# Patient Record
Sex: FEMALE | Race: WHITE | NOT HISPANIC OR LATINO | Employment: FULL TIME | URBAN - METROPOLITAN AREA
[De-identification: names, ages, dates, MRNs, and addresses within clinical notes are randomized per-mention and may not be internally consistent; named-entity substitution may affect disease eponyms.]

---

## 2017-04-20 ENCOUNTER — CONVERSION ENCOUNTER (OUTPATIENT)
Dept: GENERAL RADIOLOGY | Facility: HOSPITAL | Age: 57
End: 2017-04-20

## 2018-05-24 ENCOUNTER — CONVERSION ENCOUNTER (OUTPATIENT)
Dept: GENERAL RADIOLOGY | Facility: HOSPITAL | Age: 58
End: 2018-05-24

## 2019-03-16 ENCOUNTER — HOSPITAL ENCOUNTER (OUTPATIENT)
Dept: URGENT CARE | Facility: CLINIC | Age: 59
Discharge: HOME | End: 2019-03-16
Attending: EMERGENCY MEDICINE

## 2019-08-02 ENCOUNTER — HOSPITAL ENCOUNTER (OUTPATIENT)
Dept: GENERAL RADIOLOGY | Facility: HOSPITAL | Age: 59
Discharge: HOME | End: 2019-08-02

## 2020-01-31 ENCOUNTER — HOSPITAL ENCOUNTER (OUTPATIENT)
Dept: URGENT CARE | Facility: CLINIC | Age: 60
Discharge: HOME | End: 2020-01-31
Attending: EMERGENCY MEDICINE

## 2020-02-03 ENCOUNTER — HOSPITAL ENCOUNTER (OUTPATIENT)
Dept: OTHER | Facility: HOSPITAL | Age: 60
Discharge: HOME | End: 2020-02-03
Attending: NURSE PRACTITIONER

## 2020-02-03 ENCOUNTER — OFFICE VISIT CONVERTED (OUTPATIENT)
Dept: INTERNAL MEDICINE | Facility: CLINIC | Age: 60
End: 2020-02-03
Attending: NURSE PRACTITIONER

## 2020-02-03 LAB
ALBUMIN SERPL-MCNC: 4.4 G/DL (ref 3.5–5)
ALBUMIN/GLOB SERPL: 1.5 {RATIO} (ref 1.4–2.6)
ALP SERPL-CCNC: 86 U/L (ref 53–141)
ALT SERPL-CCNC: 14 U/L (ref 10–40)
ANION GAP SERPL CALC-SCNC: 18 MMOL/L (ref 8–19)
AST SERPL-CCNC: 18 U/L (ref 15–50)
BASOPHILS # BLD AUTO: 0.04 10*3/UL (ref 0–0.2)
BASOPHILS NFR BLD AUTO: 0.8 % (ref 0–3)
BILIRUB SERPL-MCNC: 0.38 MG/DL (ref 0.2–1.3)
BUN SERPL-MCNC: 10 MG/DL (ref 5–25)
BUN/CREAT SERPL: 14 {RATIO} (ref 6–20)
CALCIUM SERPL-MCNC: 9.7 MG/DL (ref 8.7–10.4)
CHLORIDE SERPL-SCNC: 103 MMOL/L (ref 99–111)
CHOLEST SERPL-MCNC: 196 MG/DL (ref 107–200)
CHOLEST/HDLC SERPL: 3.4 {RATIO} (ref 3–6)
CONV ABS IMM GRAN: 0.02 10*3/UL (ref 0–0.2)
CONV CO2: 24 MMOL/L (ref 22–32)
CONV IMMATURE GRAN: 0.4 % (ref 0–1.8)
CONV TOTAL PROTEIN: 7.4 G/DL (ref 6.3–8.2)
CREAT UR-MCNC: 0.72 MG/DL (ref 0.5–0.9)
DEPRECATED RDW RBC AUTO: 42.8 FL (ref 36.4–46.3)
EOSINOPHIL # BLD AUTO: 0.04 10*3/UL (ref 0–0.7)
EOSINOPHIL # BLD AUTO: 0.8 % (ref 0–7)
ERYTHROCYTE [DISTWIDTH] IN BLOOD BY AUTOMATED COUNT: 14 % (ref 11.7–14.4)
GFR SERPLBLD BASED ON 1.73 SQ M-ARVRAT: >60 ML/MIN/{1.73_M2}
GLOBULIN UR ELPH-MCNC: 3 G/DL (ref 2–3.5)
GLUCOSE SERPL-MCNC: 85 MG/DL (ref 65–99)
HCT VFR BLD AUTO: 40.5 % (ref 37–47)
HDLC SERPL-MCNC: 58 MG/DL (ref 40–60)
HGB BLD-MCNC: 12.3 G/DL (ref 12–16)
LDLC SERPL CALC-MCNC: 121 MG/DL (ref 70–100)
LYMPHOCYTES # BLD AUTO: 1.79 10*3/UL (ref 1–5)
LYMPHOCYTES NFR BLD AUTO: 35.1 % (ref 20–45)
MCH RBC QN AUTO: 25.4 PG (ref 27–31)
MCHC RBC AUTO-ENTMCNC: 30.4 G/DL (ref 33–37)
MCV RBC AUTO: 83.5 FL (ref 81–99)
MONOCYTES # BLD AUTO: 0.39 10*3/UL (ref 0.2–1.2)
MONOCYTES NFR BLD AUTO: 7.6 % (ref 3–10)
NEUTROPHILS # BLD AUTO: 2.82 10*3/UL (ref 2–8)
NEUTROPHILS NFR BLD AUTO: 55.3 % (ref 30–85)
NRBC CBCN: 0 % (ref 0–0.7)
OSMOLALITY SERPL CALC.SUM OF ELEC: 288 MOSM/KG (ref 273–304)
PLATELET # BLD AUTO: 247 10*3/UL (ref 130–400)
PMV BLD AUTO: 10.7 FL (ref 9.4–12.3)
POTASSIUM SERPL-SCNC: 4.5 MMOL/L (ref 3.5–5.3)
RBC # BLD AUTO: 4.85 10*6/UL (ref 4.2–5.4)
SODIUM SERPL-SCNC: 140 MMOL/L (ref 135–147)
TRIGL SERPL-MCNC: 86 MG/DL (ref 40–150)
TSH SERPL-ACNC: 3.05 M[IU]/L (ref 0.27–4.2)
VLDLC SERPL-MCNC: 17 MG/DL (ref 5–37)
WBC # BLD AUTO: 5.1 10*3/UL (ref 4.8–10.8)

## 2020-02-04 LAB — HCV AB SER DONR QL: <0.1 S/CO RATIO (ref 0–0.9)

## 2020-08-21 ENCOUNTER — HOSPITAL ENCOUNTER (OUTPATIENT)
Dept: OTHER | Facility: HOSPITAL | Age: 60
Discharge: HOME | End: 2020-08-21
Attending: PHYSICIAN ASSISTANT

## 2020-08-21 ENCOUNTER — OFFICE VISIT CONVERTED (OUTPATIENT)
Dept: INTERNAL MEDICINE | Facility: CLINIC | Age: 60
End: 2020-08-21
Attending: PHYSICIAN ASSISTANT

## 2020-08-27 LAB
CONV LAST MENSTURAL PERIOD: NORMAL
SPECIMEN SOURCE: NORMAL
SPECIMEN SOURCE: NORMAL
THIN PREP CVX: NORMAL

## 2020-09-10 ENCOUNTER — HOSPITAL ENCOUNTER (OUTPATIENT)
Dept: GENERAL RADIOLOGY | Facility: HOSPITAL | Age: 60
Discharge: HOME | End: 2020-09-10
Attending: PHYSICIAN ASSISTANT

## 2020-09-21 ENCOUNTER — HOSPITAL ENCOUNTER (OUTPATIENT)
Dept: CT IMAGING | Facility: HOSPITAL | Age: 60
Discharge: HOME | End: 2020-09-21
Attending: NURSE PRACTITIONER

## 2020-09-21 ENCOUNTER — OFFICE VISIT CONVERTED (OUTPATIENT)
Dept: INTERNAL MEDICINE | Facility: CLINIC | Age: 60
End: 2020-09-21
Attending: NURSE PRACTITIONER

## 2020-09-21 ENCOUNTER — HOSPITAL ENCOUNTER (OUTPATIENT)
Dept: OTHER | Facility: HOSPITAL | Age: 60
Discharge: HOME | End: 2020-09-21
Attending: NURSE PRACTITIONER

## 2020-09-21 LAB
ALBUMIN SERPL-MCNC: 4.6 G/DL (ref 3.5–5)
ALBUMIN/GLOB SERPL: 1.6 {RATIO} (ref 1.4–2.6)
ALP SERPL-CCNC: 87 U/L (ref 53–141)
ALT SERPL-CCNC: 12 U/L (ref 10–40)
ANION GAP SERPL CALC-SCNC: 18 MMOL/L (ref 8–19)
AST SERPL-CCNC: 15 U/L (ref 15–50)
BASOPHILS # BLD AUTO: 0.05 10*3/UL (ref 0–0.2)
BASOPHILS NFR BLD AUTO: 0.6 % (ref 0–3)
BILIRUB SERPL-MCNC: 0.16 MG/DL (ref 0.2–1.3)
BUN SERPL-MCNC: 14 MG/DL (ref 5–25)
BUN/CREAT SERPL: 17 {RATIO} (ref 6–20)
CALCIUM SERPL-MCNC: 9.7 MG/DL (ref 8.7–10.4)
CHLORIDE SERPL-SCNC: 102 MMOL/L (ref 99–111)
CONV ABS IMM GRAN: 0.04 10*3/UL (ref 0–0.2)
CONV CO2: 26 MMOL/L (ref 22–32)
CONV IMMATURE GRAN: 0.5 % (ref 0–1.8)
CONV TOTAL PROTEIN: 7.4 G/DL (ref 6.3–8.2)
CREAT UR-MCNC: 0.82 MG/DL (ref 0.5–0.9)
DEPRECATED RDW RBC AUTO: 42.2 FL (ref 36.4–46.3)
EOSINOPHIL # BLD AUTO: 0.06 10*3/UL (ref 0–0.7)
EOSINOPHIL # BLD AUTO: 0.7 % (ref 0–7)
ERYTHROCYTE [DISTWIDTH] IN BLOOD BY AUTOMATED COUNT: 13.6 % (ref 11.7–14.4)
GFR SERPLBLD BASED ON 1.73 SQ M-ARVRAT: >60 ML/MIN/{1.73_M2}
GLOBULIN UR ELPH-MCNC: 2.8 G/DL (ref 2–3.5)
GLUCOSE SERPL-MCNC: 99 MG/DL (ref 65–99)
HCT VFR BLD AUTO: 38 % (ref 37–47)
HGB BLD-MCNC: 11.4 G/DL (ref 12–16)
LYMPHOCYTES # BLD AUTO: 2.1 10*3/UL (ref 1–5)
LYMPHOCYTES NFR BLD AUTO: 25.3 % (ref 20–45)
MCH RBC QN AUTO: 25.5 PG (ref 27–31)
MCHC RBC AUTO-ENTMCNC: 30 G/DL (ref 33–37)
MCV RBC AUTO: 85 FL (ref 81–99)
MONOCYTES # BLD AUTO: 0.6 10*3/UL (ref 0.2–1.2)
MONOCYTES NFR BLD AUTO: 7.2 % (ref 3–10)
NEUTROPHILS # BLD AUTO: 5.44 10*3/UL (ref 2–8)
NEUTROPHILS NFR BLD AUTO: 65.7 % (ref 30–85)
NRBC CBCN: 0 % (ref 0–0.7)
OSMOLALITY SERPL CALC.SUM OF ELEC: 293 MOSM/KG (ref 273–304)
PLATELET # BLD AUTO: 318 10*3/UL (ref 130–400)
PMV BLD AUTO: 10.1 FL (ref 9.4–12.3)
POTASSIUM SERPL-SCNC: 4.7 MMOL/L (ref 3.5–5.3)
RBC # BLD AUTO: 4.47 10*6/UL (ref 4.2–5.4)
SODIUM SERPL-SCNC: 141 MMOL/L (ref 135–147)
WBC # BLD AUTO: 8.29 10*3/UL (ref 4.8–10.8)

## 2020-09-23 LAB — BACTERIA UR CULT: NORMAL

## 2021-01-15 ENCOUNTER — CONVERSION ENCOUNTER (OUTPATIENT)
Dept: INTERNAL MEDICINE | Facility: CLINIC | Age: 61
End: 2021-01-15

## 2021-01-15 ENCOUNTER — OFFICE VISIT CONVERTED (OUTPATIENT)
Dept: INTERNAL MEDICINE | Facility: CLINIC | Age: 61
End: 2021-01-15
Attending: INTERNAL MEDICINE

## 2021-03-16 ENCOUNTER — HOSPITAL ENCOUNTER (OUTPATIENT)
Dept: VACCINE CLINIC | Facility: HOSPITAL | Age: 61
Discharge: HOME | End: 2021-03-16
Attending: INTERNAL MEDICINE

## 2021-04-06 ENCOUNTER — HOSPITAL ENCOUNTER (OUTPATIENT)
Dept: VACCINE CLINIC | Facility: HOSPITAL | Age: 61
Discharge: HOME | End: 2021-04-06
Attending: INTERNAL MEDICINE

## 2021-05-13 NOTE — PROGRESS NOTES
"   Progress Note      Patient Name: April Lares   Patient ID: [id number]   Sex: Female   YOB: 1960    Primary Care Provider: Sujata LIN   Referring Provider: Sujata LIN    Visit Date: September 21, 2020    Provider: RILEY Patel   Location: [facility]   Location Address: [address] [place] [address]  [id number]   Location Phone: [phone]          Chief Complaint  · Acute visit      History Of Present Illness  April Lares is a 59 year old /White female who presents for evaluation and treatment of:      Acute visit    Patient reports abdominal pain since Friday that has worsened. Constant pain. Worse on the LLQ, radiates throughout the lower abdomen.      \"something going to explode\" per the patient.   Constant, sore, aching like sensation.   Standing makes the pain better  Sitting and lying down make it worse.   Denies urinary sx(dysuria, flank pain, frequency), fever, n/v, diarrhea, vaginal discharge    hx of ovarian cyst.          Past Medical History  Disease Name Date Onset Notes   Allergies --  --    Anemia --  --    Anemia, Unspecified --  --    Depression --  --    Eczema --  as a child   Vertigo --  --          Past Surgical History  Procedure Name Date Notes   Appendectomy --  --    Hysterectomy --  --    Jaw Surgery --  --          Medication List  Name Date Started Instructions   meclizine 25 mg oral tablet 07/20/2020 take 1 tablet (25 mg) by oral route 3 times per day as needed         Allergy List  Allergen Name Date Reaction Notes   Latex Exam Gloves --  --  --    propylene glycol --  --  --    Wellbutrin --  --  --        Allergies Reconciled  Family Medical History  Disease Name Relative/Age Notes   Diabetes, unspecified type Father/   Father         Social History  Finding Status Start/Stop Quantity Notes   Alcohol Never 0/0 --  drinks no   Caffeine Current every day 0/0 --  drinks regularly; coffee and " "tea; 1-2 times per day   Second hand smoke exposure Never 0/0 --  no   Tobacco Never 0/0 --  never a smoker         Review of Systems  · Constitutional  o Denies  o : fever, fatigue, weight loss, weight gain  · Cardiovascular  o Denies  o : lower extremity edema, claudication, chest pressure, palpitations  · Respiratory  o Denies  o : shortness of breath, wheezing, cough, hemoptysis, dyspnea on exertion  · Gastrointestinal  o Admits  o : abdominal pain  o Denies  o : nausea, vomiting, diarrhea, constipation      Vitals  Date Time BP Position Site L\R Cuff Size HR RR TEMP (F) WT  HT  BMI kg/m2 BSA m2 O2 Sat HC       02/03/2020 08:35 /72 Sitting    68 - R 16 98.2 142lbs 6oz 5'  2.5\" 25.63 1.69 100 %    08/21/2020 03:23 /62 Sitting    77 - R 15 97.8     100 %    09/21/2020 01:20 /68 Sitting    71 - R 18 98.2  5'  2.5\"   100 %          Physical Examination  · Constitutional  o Appearance  o : no acute distress, well-nourished  · Head and Face  o Head  o :   § Inspection  § : atraumatic, normocephalic  · Eyes  o Eyes  o : extraocular movements intact, no scleral icterus, no conjunctival injection  · Ears, Nose, Mouth and Throat  o Ears  o :   § External Ears  § : normal  § Otoscopic Examination  § : tympanic membrane appearance within normal limits bilaterally  o Nose  o :   § Intranasal Exam  § : nares patent  o Oral Cavity  o :   § Oral Mucosa  § : moist mucous membranes  o Throat  o :   § Oropharynx  § : no inflammation or lesions present, tonsils within normal limits  · Respiratory  o Respiratory Effort  o : breathing comfortably, symmetric chest rise  o Auscultation of Lungs  o : clear to asculatation bilaterally, no wheezes, rales, or rhonchii  · Cardiovascular  o Heart  o :   § Auscultation of Heart  § : regular rate and rhythm, no murmurs, rubs, or gallops  o Peripheral Vascular System  o :   § Extremities  § : no edema  · Gastrointestinal  o Abdominal Examination  o :   § Abdomen  § : bowel " sounds present, non-distended, severe tenderness to the left lower quadrant with palpation.   · Lymphatic  o Neck  o : no lymphadenopathy present  o Supraclavicular Nodes  o : no supraclavicular nodes  · Skin and Subcutaneous Tissue  o General Inspection  o : no lesions present, no areas of discoloration, skin turgor normal  · Neurologic  o Mental Status Examination  o :   § Orientation  § : grossly oriented to person, place and time  o Gait and Station  o :   § Gait Screening  § : normal gait  · Psychiatric  o General  o : normal mood and affect          Results  · In-Office Procedures  o Lab procedure  § IOP - Urinalysis without Microscopy (Clinitek) [facility] ([id number])   § Color Ur: Dark yellow   § Clarity Ur: Slightly cloudy   § Glucose Ur Ql Strip: Negative   § Bilirub Ur Ql Strip: Negative   § Ketones Ur Ql Strip: Trace   § Sp Gr Ur Qn: greater than 1.030   § Hgb Ur Ql Strip: Trace-Intact   § pH Ur-LsCnc: 5.5   § Prot Ur Ql Strip: Negative   § Urobilinogen Ur Strip-mCnc: 0.2 E.U./dL   § Nitrite Ur Ql Strip: Negative   § WBC Est Ur Ql Strip: Negative       Assessment  · Abdominal pain     789.00/R10.9  · LLQ pain     789.04/R10.32  patient set up for a CT scan this afternoon at 6:00 pm with oral contrast & IV contrast. Patient educated and giving information on how to take the oral contrast. Will follow up after CT scan report. Labs drawn in office. Educated patient that if her pain worsens prior to Ct scan to report to the ER.   · Hematuria     599.70/R31.9    Problems Reconciled  Plan  · Orders  o CBC with Auto Diff [facility] (84747) - 789.00/R10.9 - 09/21/2020  o CMP [facility] (26494) - 789.00/R10.9 - 09/21/2020  o Abdomen and Pelvis (CT) (with contrast) (61750) - 789.00/R10.9 - 09/21/2020  o Urine culture (47101, 61605) - 789.00/R10.9 - 09/21/2020  o ACO-39: Current medications updated and reviewed () - - 09/21/2020  · Medications  o Medications have been Reconciled  o Transition of Care or Provider  Policy  · Instructions  o Instructed to seek medical attention urgently for new or worsening symptoms.  o Recommended BRAT diet.  o Clear fluids and avoid dairy for the next 24 hours.  o Rest. Increase Fluids.  o Patient was educated/instructed on their diagnosis, treatment and medications prior to discharge from the clinic today.  o Call the office with any concerns or questions.  · Disposition  o Call or Return if symptoms worsen or persist.  o As Needed for Follow Up  o Please Schedule Imaging            Electronically Signed by: RILEY Patel -Author on September 21, 2020 03:29:35 PM

## 2021-05-13 NOTE — PROGRESS NOTES
Progress Note      Patient Name: April Lares   Patient ID: [id number]   Sex: Female   YOB: 1960    Primary Care Provider: Sujata LIN   Referring Provider: Sujata LIN    Visit Date: August 21, 2020    Provider: Joselin Caba PA-C   Location: [facility]   Location Address: [address] [place] [address]  [id number]   Location Phone: [phone]          Chief Complaint  · Annual Exam  · PAP exam      History Of Present Illness  Last PAP Smear: July 2019.   Date of Last Mammogram: 2019.   Date of Last Colonoscopy: 2016   No current complaints.      Partial Hysterectomy 2014.  She has bilateral ovaries.   She denies vaginal discharge, odor, lesions. Declines std testing.    She is due for mammogram next month. She does self breast exams at home.       Past Medical History  Disease Name Date Onset Notes   Allergies --  --    Anemia --  --    Anemia, Unspecified --  --    Depression --  --    Eczema --  as a child   Vertigo --  --          Past Surgical History  Procedure Name Date Notes   Appendectomy --  --    Hysterectomy --  --    Jaw Surgery --  --          Medication List  Name Date Started Instructions   meclizine 25 mg oral tablet 07/20/2020 take 1 tablet (25 mg) by oral route 3 times per day as needed         Allergy List  Allergen Name Date Reaction Notes   Latex Exam Gloves --  --  --    propylene glycol --  --  --    Wellbutrin --  --  --        Allergies Reconciled  Family Medical History  Disease Name Relative/Age Notes   Diabetes, unspecified type Father/   Father         Social History  Finding Status Start/Stop Quantity Notes   Alcohol Never 0/0 --  drinks no   Caffeine Current every day 0/0 --  drinks regularly; coffee and tea; 1-2 times per day   Second hand smoke exposure Never 0/0 --  no   Tobacco Never 0/0 --  never a smoker         Review of Systems  · Constitutional  o Denies  o : appetite change, fatigue, night sweats, weight  "gain, weight loss  · HENT  o Denies  o : hearing loss, tinnitus, vertigo, nasal discharge, nose bleeds, dental pain, mouth lesions, sore throat  · Breasts  o Denies  o : Lumps, tenderness, swelling, Nipple Discharge  · Cardiovascular  o Denies  o : chest Pain, decreased exercise tolerance, exertional dyspnea, palpitations  · Respiratory  o Denies  o : frequent cough, shortness of breath, wheezing, snoring, apneas  · Gastrointestinal  o Denies  o : abdominal pain, nausea, vomiting, dysphagia, reflux/heartburn, changes in bowel habits, constipation, diarrhea  · Genitourinary  o Denies  o : dysuria, hematuria, incontinence, nocturia, urinary frequency, urinary urgency, sexual dysfunction  · Neurologic  o Denies  o : abnormal gait, facial weakness, headache, memory problems, tingling or numbness, seizures, tremor  · Psychiatric  o Denies  o : anxiety, decreased concentration, irritability, panic attacks, sleep distrubances, sadness/tearfulness      Vitals  Date Time BP Position Site L\R Cuff Size HR RR TEMP (F) WT  HT  BMI kg/m2 BSA m2 O2 Sat        02/03/2020 08:35 /72 Sitting    68 - R 16 98.2 142lbs 6oz 5'  2.5\" 25.63 1.69 100 %    08/21/2020 03:23 /62 Sitting    77 - R 15 97.8     100 %          Physical Examination  · Constitutional  o Appearance  o : well-nourished, in no acute distress  · Neck  o Inspection/Palpation  o : normal appearance, no masses or tenderness, trachea midline  o Thyroid  o : gland size normal, nontender, no nodules or masses present on palpation  · Respiratory  o Respiratory Effort  o : breathing unlabored  o Inspection of Chest  o : normal appearance  o Auscultation of Lungs  o : normal breath sounds throughout  · Cardiovascular  o Heart  o :   § Auscultation of Heart  § : regular rate and rhythm, no murmurs, gallops or rubs  · Breasts  o Inspection of Breasts  o : breasts symmetrical, no skin changes, no deformities present, no discharge present  o Palpation of Breasts, " Axillae  o : no masses present on palpation, no breast tenderness  · Gastrointestinal  o Abdominal Examination  o : abdomen nontender to palpation, tone normal without rigidity or guarding, no masses present, normal bowel sounds  · Genitourinary  o External Genitalia  o : no inflammation, no lesions present  o Vagina  o : normal vaginal vault, no discharge present, no inflammatory lesions present, no masses present  o Bladder  o : nontender to palpation  o Cervix  o : surgically absent  o Uterus  o : surgically absent  o Adnexa  o : no tenderness or masses present on bimanual examination  o Anus  o : no inflammation or lesions present  o Perineum  o : perineum within normal limits  · Lymphatic  o Neck  o : no lymphadenopathy present  o Axilla  o : no lymphadenopathy present  o Groin  o : no lymphadenopathy present  · Neurologic  o Mental Status Examination  o :   § Orientation  § : grossly oriented to person, place and time  o Gait and Station  o : normal gait, able to stand without difficulty  · Psychiatric  o Judgement and Insight  o : judgment and insight intact  o Mood and Affect  o : mood normal, affect appropriate              Assessment  · Screening for colon cancer     V76.51/Z12.11  Pt UTD on colonoscopy  · Routine gynecological examination     V72.31/Z01.419  · Pap smear, as part of routine gynecological examination     V76.2/Z01.419  · Screening Mammogram     V76.10/Z12.39      Plan  · Orders  o Pap smear (69821) - V76.2/Z01.419 - 08/21/2020  o Screening Mammogram 2D Bilateral (61941, ) - V76.10/Z12.39 - 08/21/2020   ARELI  · Medications  o Medications have been Reconciled  o Transition of Care or Provider Policy  · Instructions  o **Pap Test/Liquid Based:   o Source:  o ********  o **Perform Reflex Human Papilloma Virus (HPV) High Risk on this Pap (If atypical squamous cells of the undetermined signifigcance (ASCUS)/Atypical Glandular Cells of undetermined significance (AGCUS): Low Grade Squamous  Intraepitheal lesion (LGSIL): **  o No  o ********  o Medicare:  o **Is this an annual PAP:  o Yes  o Counseled on monthly breast self exams.   o Counseled on STD prevention.  o Counseled on diet and exercise.   o Counseled on weight-bearing exercise.  o Recommended Calcium with Vitamin D twice daily.  · Disposition  o Call or Return if symptoms worsen or persist.  o Follow up in 6 months            Electronically Signed by: Joselin Caba PA-C -Author on August 22, 2020 08:30:36 AM

## 2021-05-14 VITALS
SYSTOLIC BLOOD PRESSURE: 100 MMHG | TEMPERATURE: 97.6 F | HEIGHT: 62 IN | DIASTOLIC BLOOD PRESSURE: 50 MMHG | OXYGEN SATURATION: 100 % | HEART RATE: 80 BPM

## 2021-05-14 VITALS
OXYGEN SATURATION: 100 % | RESPIRATION RATE: 18 BRPM | TEMPERATURE: 98.2 F | HEIGHT: 62 IN | SYSTOLIC BLOOD PRESSURE: 106 MMHG | HEART RATE: 71 BPM | DIASTOLIC BLOOD PRESSURE: 68 MMHG

## 2021-05-14 VITALS
RESPIRATION RATE: 15 BRPM | SYSTOLIC BLOOD PRESSURE: 104 MMHG | HEART RATE: 77 BPM | DIASTOLIC BLOOD PRESSURE: 62 MMHG | OXYGEN SATURATION: 100 % | TEMPERATURE: 97.8 F

## 2021-05-14 NOTE — PROGRESS NOTES
Progress Note      Patient Name: April Lares   Patient ID: [id number]   Sex: Female   YOB: 1960    Primary Care Provider: Sujata LIN   Referring Provider: Sujata LIN    Visit Date: January 15, 2021    Provider: Thao Adams MD   Location: [facility]   Location Address: [address] [place] [address]  [id number]   Location Phone: [phone]          Chief Complaint  · possible hemorrhoids      History Of Present Illness  April Lares is a 60 year old /White female who presents for evaluation and treatment of:      possible hemorrhoids- started in december, cleared up a little however now it has come back.    worse with having a bowel movement, no hard stools, no straining     no itching, just painful, having blood with bowel movements    has been using prep H cream and hem suppository               Past Medical History  Disease Name Date Onset Notes   Allergies --  --    Anemia --  --    Anemia, Unspecified --  --    Depression --  --    Eczema --  as a child   Vertigo --  --          Past Surgical History  Procedure Name Date Notes   Appendectomy --  --    Hysterectomy --  --    Jaw Surgery --  --          Medication List  Name Date Started Instructions   meclizine 25 mg oral tablet 07/20/2020 take 1 tablet (25 mg) by oral route 3 times per day as needed   Proctofoam 1 % topical foam 01/22/2021 apply by external route 2 times a day as needed   Rectiv 0.4 % (w/w) rectal ointment 01/20/2021 apply by rectal route 2 times a day as needed         Allergy List  Allergen Name Date Reaction Notes   Latex Exam Gloves --  --  --    propylene glycol --  --  --    Wellbutrin --  --  --        Allergies Reconciled  Family Medical History  Disease Name Relative/Age Notes   Diabetes, unspecified type Father/   Father         Social History  Finding Status Start/Stop Quantity Notes   Alcohol Never 0/0 --  drinks no   Caffeine Current every  "day 0/0 --  drinks regularly; coffee and tea; 1-2 times per day   Second hand smoke exposure Never 0/0 --  no   Tobacco Never 0/0 --  never a smoker         Review of Systems  · Constitutional  o Denies  o : fever, fatigue, weight loss, weight gain  · Cardiovascular  o Denies  o : lower extremity edema, claudication, chest pressure, palpitations  · Respiratory  o Denies  o : shortness of breath, wheezing, cough, hemoptysis, dyspnea on exertion  · Gastrointestinal  o Denies  o : nausea, vomiting, diarrhea, constipation, abdominal pain      Vitals  Date Time BP Position Site L\R Cuff Size HR RR TEMP (F) WT  HT  BMI kg/m2 BSA m2 O2 Sat FR L/min FiO2 HC       08/21/2020 03:23 /62 Sitting    77 - R 15 97.8     100 %      09/21/2020 01:20 /68 Sitting    71 - R 18 98.2  5'  2.5\"   100 %  21%    01/15/2021 04:06 /50 Sitting    80 - R  97.6  5'  2.5\"   100 %            Physical Examination  · Constitutional  o Appearance  o : no acute distress, well-nourished  · Head and Face  o Head  o :   § Inspection  § : atraumatic, normocephalic  · Eyes  o Eyes  o : extraocular movements intact, no scleral icterus, no conjunctival injection  · Ears, Nose, Mouth and Throat  o Ears  o :   § External Ears  § : normal  o Nose  o :   § Intranasal Exam  § : nares patent  o Oral Cavity  o :   § Oral Mucosa  § : moist mucous membranes  · Respiratory  o Respiratory Effort  o : breathing comfortably, symmetric chest rise  o Auscultation of Lungs  o : clear to asculatation bilaterally, no wheezes, rales, or rhonchii  · Cardiovascular  o Heart  o :   § Auscultation of Heart  § : regular rate and rhythm, no murmurs, rubs, or gallops  o Peripheral Vascular System  o :   § Extremities  § : no edema  · Neurologic  o Mental Status Examination  o :   § Orientation  § : grossly oriented to person, place and time  o Gait and Station  o :   § Gait Screening  § : normal gait  · Psychiatric  o General  o : normal mood and " affect              Assessment  · Hemorrhoids     455.6/K64.9  will give trial of topical vasoactive medication  can continue topical steroid       Plan  · Orders  o ACO-39: Current medications updated and reviewed (1159F, ) - - 01/15/2021  · Medications  o Rectiv 0.4 % (w/w) rectal ointment   SIG: apply by rectal route 2 times a day as needed   DISP: (1) Tube with 0 refills  Prescribed on 01/15/2021     o Medications have been Reconciled  o Transition of Care or Provider Policy  · Instructions  o Patient was educated/instructed on their diagnosis, treatment and medications prior to discharge from the clinic today.  o Patient instructed to seek medical attention urgently for new or worsening symptoms.  o Call the office with any concerns or questions.            Electronically Signed by: Thao Adams MD -Author on February 10, 2021 03:17:31 PM

## 2021-05-15 VITALS
WEIGHT: 142.37 LBS | SYSTOLIC BLOOD PRESSURE: 130 MMHG | HEIGHT: 62 IN | TEMPERATURE: 98.2 F | HEART RATE: 68 BPM | OXYGEN SATURATION: 100 % | BODY MASS INDEX: 26.2 KG/M2 | DIASTOLIC BLOOD PRESSURE: 72 MMHG | RESPIRATION RATE: 16 BRPM

## 2021-09-09 ENCOUNTER — TRANSCRIBE ORDERS (OUTPATIENT)
Dept: ADMINISTRATIVE | Facility: HOSPITAL | Age: 61
End: 2021-09-09

## 2021-09-09 DIAGNOSIS — Z12.31 VISIT FOR SCREENING MAMMOGRAM: Primary | ICD-10-CM

## 2021-11-30 ENCOUNTER — HOSPITAL ENCOUNTER (OUTPATIENT)
Dept: MAMMOGRAPHY | Facility: HOSPITAL | Age: 61
Discharge: HOME | End: 2021-11-30
Admitting: NURSE PRACTITIONER

## 2021-11-30 DIAGNOSIS — Z12.31 VISIT FOR SCREENING MAMMOGRAM: ICD-10-CM

## 2021-11-30 PROCEDURE — 77063 BREAST TOMOSYNTHESIS BI: CPT

## 2021-11-30 PROCEDURE — 77067 SCR MAMMO BI INCL CAD: CPT

## 2022-08-01 ENCOUNTER — APPOINTMENT (OUTPATIENT)
Dept: GENERAL RADIOLOGY | Facility: HOSPITAL | Age: 62
End: 2022-08-01

## 2022-08-01 ENCOUNTER — HOSPITAL ENCOUNTER (EMERGENCY)
Facility: HOSPITAL | Age: 62
Discharge: HOME | End: 2022-08-01
Attending: EMERGENCY MEDICINE | Admitting: EMERGENCY MEDICINE

## 2022-08-01 VITALS
OXYGEN SATURATION: 100 % | BODY MASS INDEX: 26.04 KG/M2 | SYSTOLIC BLOOD PRESSURE: 110 MMHG | HEIGHT: 62 IN | RESPIRATION RATE: 13 BRPM | TEMPERATURE: 98.4 F | DIASTOLIC BLOOD PRESSURE: 64 MMHG | HEART RATE: 71 BPM

## 2022-08-01 DIAGNOSIS — M54.9 BACK PAIN WITH SCIATICA: Primary | ICD-10-CM

## 2022-08-01 DIAGNOSIS — M54.30 BACK PAIN WITH SCIATICA: Primary | ICD-10-CM

## 2022-08-01 PROCEDURE — 72100 X-RAY EXAM L-S SPINE 2/3 VWS: CPT

## 2022-08-01 PROCEDURE — 99282 EMERGENCY DEPT VISIT SF MDM: CPT

## 2022-08-01 PROCEDURE — 25010000002 ORPHENADRINE CITRATE PER 60 MG: Performed by: NURSE PRACTITIONER

## 2022-08-01 PROCEDURE — 25010000002 KETOROLAC TROMETHAMINE PER 15 MG: Performed by: NURSE PRACTITIONER

## 2022-08-01 PROCEDURE — 96372 THER/PROPH/DIAG INJ SC/IM: CPT

## 2022-08-01 RX ORDER — METHOCARBAMOL 750 MG/1
750 TABLET, FILM COATED ORAL 3 TIMES DAILY PRN
Qty: 10 TABLET | Refills: 0 | Status: SHIPPED | OUTPATIENT
Start: 2022-08-01 | End: 2022-11-16

## 2022-08-01 RX ORDER — KETOROLAC TROMETHAMINE 30 MG/ML
30 INJECTION, SOLUTION INTRAMUSCULAR; INTRAVENOUS ONCE
Status: COMPLETED | OUTPATIENT
Start: 2022-08-01 | End: 2022-08-01

## 2022-08-01 RX ORDER — MELOXICAM 7.5 MG/1
7.5 TABLET ORAL DAILY
Qty: 10 TABLET | Refills: 0 | Status: SHIPPED | OUTPATIENT
Start: 2022-08-01 | End: 2022-11-16

## 2022-08-01 RX ORDER — ORPHENADRINE CITRATE 30 MG/ML
60 INJECTION INTRAMUSCULAR; INTRAVENOUS ONCE
Status: COMPLETED | OUTPATIENT
Start: 2022-08-01 | End: 2022-08-01

## 2022-08-01 RX ADMIN — ORPHENADRINE CITRATE 60 MG: 30 INJECTION INTRAMUSCULAR; INTRAVENOUS at 10:40

## 2022-08-01 RX ADMIN — KETOROLAC TROMETHAMINE 30 MG: 30 INJECTION, SOLUTION INTRAMUSCULAR; INTRAVENOUS at 10:40

## 2022-08-01 NOTE — DISCHARGE INSTRUCTIONS
Please follow-up with your primary care provider, if you not have a primary care provider please utilize patient connection above to establish care  Return to the ED for new or worsening symptoms  Follow up with Specialist if indicated above-call for an appointment  Do not take additional NSAIDs such as Advil, Motrin, ibuprofen, Aleve while taking prescribed Mobic

## 2022-08-01 NOTE — ED PROVIDER NOTES
Subjective    Chief Complaint   Patient presents with   • Back Pain     Sujata Haywood APRN  No LMP recorded. Patient has had a hysterectomy.  Allergies   Allergen Reactions   • Bupropion Hives   • Latex Hives   • Propylene Glycol Hives       Patient is a 61-year-old female presents emergency department complaint of left low back pain rating down her left leg.  Patient reports this is been an ongoing problem for her for several months, but the pain became worse recently.  She feels that her sciatica is inflamed.  Denies any new numbness, tingling, weakness, no urinary retention, bowel or bladder incontinence, no IVDA.            Review of Systems   Constitutional: Negative for chills and fever.   Musculoskeletal: Positive for back pain. Negative for neck pain.   Neurological: Negative for weakness and numbness.       Past Medical History:   Diagnosis Date   • Anemia    • Diverticulosis        Allergies   Allergen Reactions   • Bupropion Hives   • Latex Hives   • Propylene Glycol Hives       Past Surgical History:   Procedure Laterality Date   • APPENDECTOMY     • HYSTERECTOMY     • MANDIBLE SURGERY         History reviewed. No pertinent family history.    Social History     Socioeconomic History   • Marital status: Single   Tobacco Use   • Smoking status: Never Smoker   • Smokeless tobacco: Never Used   Vaping Use   • Vaping Use: Never used   Substance and Sexual Activity   • Alcohol use: Not Currently   • Drug use: Never           Objective   Physical Exam  Vitals and nursing note reviewed.   Constitutional:       General: She is not in acute distress.     Appearance: Normal appearance. She is not ill-appearing, toxic-appearing or diaphoretic.   Cardiovascular:      Rate and Rhythm: Normal rate and regular rhythm.      Heart sounds: Normal heart sounds.   Pulmonary:      Effort: Pulmonary effort is normal.      Breath sounds: Normal breath sounds.   Musculoskeletal:        Back:       Comments: No vertebral point  "tenderness noted to the C-spine T-spine or L-spine.  No palpable step-offs.  No soft tissue tenderness is noted.  No skin abnormalities are appreciated.  No saddle anesthesia   Skin:     General: Skin is warm and dry.      Capillary Refill: Capillary refill takes less than 2 seconds.   Neurological:      Mental Status: She is alert and oriented to person, place, and time.         Procedures           ED Course           /64 (BP Location: Left arm, Patient Position: Sitting)   Pulse 71   Temp 98.4 °F (36.9 °C) (Oral)   Resp 13   Ht 157.5 cm (62\")   SpO2 100%   BMI 26.04 kg/m²   Labs Reviewed - No data to display  Medications   ketorolac (TORADOL) injection 30 mg (30 mg Intramuscular Given 8/1/22 1040)   orphenadrine (NORFLEX) injection 60 mg (60 mg Intramuscular Given 8/1/22 1040)     XR Spine Lumbar 2 or 3 View    Result Date: 8/1/2022    1. No acute osseous abnormality or significant degenerative disc disease of the lumbar spine.  2. Mild endplate osteophyte formation of the upper lumbar levels.      PADDY LEI MD       Electronically Signed and Approved By: PADDY LEI MD on 8/01/2022 at 11:09                                             [facility]  Labs and imaging ordered and reviewed above.   --Differentials: Sciatica, cauda equina, lumbar strain    --Patient was brought back to the emergency department room for evaluation and placed on appropriate monitoring.  Patient resting comfortably.  X-ray imaging reviewed as above.  No acute findings.  Will treat with anti-inflammatory and muscle relaxer.  Patient has point with PCP tomorrow.  This document is intended for medical expert use only. Reading of this document by patients and/or patient's family without participating medical staff guidance may result in misinterpretation and unintended morbidity.  Any interpretation of such data is the responsibility of the patient and/or family member responsible for the patient in concert with their primary " or specialist providers, not to be left for sources of online searches such as FundedByMe, RMI or similar queries. Relying on these approaches to knowledge may result in misinterpretation, misguided goals of care and even death should patients or family members try recommendations outside of the realm of professional medical care in a supervised inpatient environment.Appropriate PPE was worn during the duration of the care for this patient while in the emergency department per [facility] Policy                                        Final diagnoses:   Back pain with sciatica       ED Disposition  ED Disposition     ED Disposition   Discharge    Condition   Stable    Comment   --             Sujata Haywood, APRGEORGINA  [address] [address] [phone]      as scheduled tomorrow    PATIENT CONNECTION - [place] [id number]  [phone]  Schedule an appointment as soon as possible for a visit            Medication List      New Prescriptions    meloxicam 7.5 MG tablet  Commonly known as: MOBIC  Take 1 tablet by mouth Daily.     methocarbamol 750 MG tablet  Commonly known as: ROBAXIN  Take 1 tablet by mouth 3 (Three) Times a Day As Needed for Muscle Spasms.        Stop    cyclobenzaprine 10 MG tablet  Commonly known as: FLEXERIL     diclofenac 50 MG EC tablet  Commonly known as: VOLTAREN           Where to Get Your Medications      These medications were sent to [facility], [place] - [address] AT [facility] [address] - [phone]  - [phone]   [address] [facility] [address]    Phone: [phone]   · meloxicam 7.5 MG tablet  · methocarbamol 750 MG tablet          Paris Selby, APRN  08/01/22 1249

## 2022-11-01 ENCOUNTER — TRANSCRIBE ORDERS (OUTPATIENT)
Dept: ADMINISTRATIVE | Facility: HOSPITAL | Age: 62
End: 2022-11-01

## 2022-11-01 DIAGNOSIS — Z12.31 BREAST CANCER SCREENING BY MAMMOGRAM: Primary | ICD-10-CM

## 2022-11-16 ENCOUNTER — OFFICE VISIT (OUTPATIENT)
Dept: INTERNAL MEDICINE | Facility: CLINIC | Age: 62
End: 2022-11-16

## 2022-11-16 VITALS
HEART RATE: 71 BPM | DIASTOLIC BLOOD PRESSURE: 62 MMHG | TEMPERATURE: 96.9 F | SYSTOLIC BLOOD PRESSURE: 114 MMHG | OXYGEN SATURATION: 99 %

## 2022-11-16 DIAGNOSIS — K59.00 CONSTIPATION, UNSPECIFIED CONSTIPATION TYPE: ICD-10-CM

## 2022-11-16 DIAGNOSIS — M54.42 CHRONIC LEFT-SIDED LOW BACK PAIN WITH LEFT-SIDED SCIATICA: Primary | ICD-10-CM

## 2022-11-16 DIAGNOSIS — Z23 NEED FOR INFLUENZA VACCINATION: ICD-10-CM

## 2022-11-16 DIAGNOSIS — G89.29 CHRONIC LEFT-SIDED LOW BACK PAIN WITH LEFT-SIDED SCIATICA: Primary | ICD-10-CM

## 2022-11-16 PROCEDURE — 90471 IMMUNIZATION ADMIN: CPT | Performed by: PHYSICIAN ASSISTANT

## 2022-11-16 PROCEDURE — 90686 IIV4 VACC NO PRSV 0.5 ML IM: CPT | Performed by: PHYSICIAN ASSISTANT

## 2022-11-16 PROCEDURE — 99214 OFFICE O/P EST MOD 30 MIN: CPT | Performed by: PHYSICIAN ASSISTANT

## 2022-11-16 NOTE — ASSESSMENT & PLAN NOTE
Discussed low back pain with patient. Patient advised to take medications as directed, can start daily NSAID for inflammation. Take with food to prevent GI upset. Will get MRI due to sx and abnormal xray. Use heat and ice for symptomatic relief. Patient was advised to rest initially then slowly increase activity level as tolerated. Monitor changes in symptoms such as worsening numbness, tingling, or weakness in the legs, changes in bowel or bladder habits, or worsening pain. Proper ergonomics discussed. Will refer to PT or neurosurgery based on imaging results. Patient understands and agrees with plan.

## 2022-11-16 NOTE — ASSESSMENT & PLAN NOTE
Discussed constipation. Increase water intake and fiber in diet (fresh fruits and vegetables). Will try stool cleanout, paper with instructions given today. Will use 1 dose an hour x 8 hours max, if no result in 1 day then repeat the next day. Continue dosing until patient is having clear, liquid stools. Then start daily miralax to help prevent constipation. Discussed need to rtc if no improvement with conservative treatment, if symptoms worsen, or if patient had blood in stool. Parent understands and agrees

## 2022-11-16 NOTE — PROGRESS NOTES
Chief Complaint  Leg Pain (Left pain- 9 months) and Back Pain (Lower back- ongoing chronic)    Subjective          April Lares presents to [facility]  History of Present Illness  Pt here for eval of LBP and left leg pain x 9 months   Sx come and goes  Back pain is more constant, chronic pain  Aug 2022 she states sx flared up after bending incorrectly. She was seen in the ER at that time and had XR. States she was told she had arthritis.   Pain worse if sitting, laying.  Pain improved with standing  Pain moves from lower back down into left leg  She has numbness/tingling in L leg   Denies incontinence, saddle anesthesia   Denies known trauma to back  She has tried nsaids, muscle relaxers without improvement.     Constipation: has to use suppository to have bm every few days  Otherwise she does not have bm on her own   Admits to not drinking water.           Past Medical History:   Diagnosis Date   • Anemia    • Diverticulosis         Past Surgical History:   Procedure Laterality Date   • APPENDECTOMY     • HYSTERECTOMY     • MANDIBLE SURGERY          Current Outpatient Medications on File Prior to Visit   Medication Sig Dispense Refill   • [DISCONTINUED] meloxicam (MOBIC) 7.5 MG tablet Take 1 tablet by mouth Daily. 10 tablet 0   • [DISCONTINUED] methocarbamol (ROBAXIN) 750 MG tablet Take 1 tablet by mouth 3 (Three) Times a Day As Needed for Muscle Spasms. 10 tablet 0   • [DISCONTINUED] Pramoxine HCl, Perianal, 1 % foam Proctofoam 1 % topical foam apply by external route 2 times a day as needed 1/22/2021  Active       No current facility-administered medications on file prior to visit.        Allergies   Allergen Reactions   • Bupropion Hives   • Latex Hives   • Propylene Glycol Hives       Social History     Tobacco Use   Smoking Status Never   Smokeless Tobacco Never          Objective   Vital Signs:   /62 (BP Location: Left arm, Patient Position: Sitting,  Cuff Size: Adult)   Pulse 71   Temp 96.9 °F (36.1 °C)   SpO2 99%     Physical Exam  Vitals reviewed.   Constitutional:       Appearance: Normal appearance.   HENT:      Head: Normocephalic and atraumatic.      Nose: Nose normal.      Mouth/Throat:      Mouth: Mucous membranes are moist.   Eyes:      Extraocular Movements: Extraocular movements intact.      Conjunctiva/sclera: Conjunctivae normal.      Pupils: Pupils are equal, round, and reactive to light.   Cardiovascular:      Rate and Rhythm: Normal rate and regular rhythm.   Pulmonary:      Effort: Pulmonary effort is normal.      Breath sounds: Normal breath sounds.   Abdominal:      General: Abdomen is flat. Bowel sounds are normal.      Palpations: Abdomen is soft.   Musculoskeletal:      Lumbar back: Positive left straight leg raise test.   Neurological:      General: No focal deficit present.      Mental Status: She is alert and oriented to person, place, and time.   Psychiatric:         Mood and Affect: Mood normal.        Result Review :   The following data was reviewed by: Joselin Caba PA-C on 11/16/2022:    Data reviewed: Radiologic studies Xray L spine          Assessment and Plan    Diagnoses and all orders for this visit:    1. Chronic left-sided low back pain with left-sided sciatica (Primary)  Assessment & Plan:  Discussed low back pain with patient. Patient advised to take medications as directed, can start daily NSAID for inflammation. Take with food to prevent GI upset. Will get MRI due to sx and abnormal xray. Use heat and ice for symptomatic relief. Patient was advised to rest initially then slowly increase activity level as tolerated. Monitor changes in symptoms such as worsening numbness, tingling, or weakness in the legs, changes in bowel or bladder habits, or worsening pain. Proper ergonomics discussed. Will refer to PT or neurosurgery based on imaging results. Patient understands and agrees with plan.      Orders:  -     MRI  Lumbar Spine Without Contrast; Future  -     Comprehensive Metabolic Panel  -     CBC & Differential  -     TSH    2. Need for influenza vaccination  -     FluLaval/Fluzone >6 mos ([id number])    3. Constipation, unspecified constipation type  Assessment & Plan:  Discussed constipation. Increase water intake and fiber in diet (fresh fruits and vegetables). Will try stool cleanout, paper with instructions given today. Will use 1 dose an hour x 8 hours max, if no result in 1 day then repeat the next day. Continue dosing until patient is having clear, liquid stools. Then start daily miralax to help prevent constipation. Discussed need to rtc if no improvement with conservative treatment, if symptoms worsen, or if patient had blood in stool. Parent understands and agrees          Follow Up   Return in about 1 month (around 12/16/2022).  Patient was given instructions and counseling regarding her condition or for health maintenance advice. Please see specific information pulled into the AVS if appropriate.

## 2022-12-15 ENCOUNTER — HOSPITAL ENCOUNTER (OUTPATIENT)
Dept: MRI IMAGING | Facility: HOSPITAL | Age: 62
Discharge: HOME | End: 2022-12-15
Admitting: PHYSICIAN ASSISTANT

## 2022-12-15 DIAGNOSIS — G89.29 CHRONIC LEFT-SIDED LOW BACK PAIN WITH LEFT-SIDED SCIATICA: Primary | ICD-10-CM

## 2022-12-15 DIAGNOSIS — M51.36 DEGENERATIVE DISC DISEASE, LUMBAR: Primary | ICD-10-CM

## 2022-12-15 DIAGNOSIS — G89.29 CHRONIC LEFT-SIDED LOW BACK PAIN WITH LEFT-SIDED SCIATICA: ICD-10-CM

## 2022-12-15 DIAGNOSIS — M54.42 CHRONIC LEFT-SIDED LOW BACK PAIN WITH LEFT-SIDED SCIATICA: Primary | ICD-10-CM

## 2022-12-15 DIAGNOSIS — M54.42 CHRONIC LEFT-SIDED LOW BACK PAIN WITH LEFT-SIDED SCIATICA: ICD-10-CM

## 2022-12-15 DIAGNOSIS — M51.36 DEGENERATIVE DISC DISEASE, LUMBAR: ICD-10-CM

## 2022-12-15 PROCEDURE — 72148 MRI LUMBAR SPINE W/O DYE: CPT

## 2022-12-27 ENCOUNTER — OFFICE VISIT (OUTPATIENT)
Dept: NEUROSURGERY | Facility: CLINIC | Age: 62
End: 2022-12-27

## 2022-12-27 VITALS
DIASTOLIC BLOOD PRESSURE: 62 MMHG | WEIGHT: 142 LBS | HEIGHT: 62 IN | HEART RATE: 70 BPM | SYSTOLIC BLOOD PRESSURE: 117 MMHG | BODY MASS INDEX: 26.13 KG/M2

## 2022-12-27 DIAGNOSIS — G89.29 CHRONIC MIDLINE LOW BACK PAIN WITH LEFT-SIDED SCIATICA: ICD-10-CM

## 2022-12-27 DIAGNOSIS — M51.36 DDD (DEGENERATIVE DISC DISEASE), LUMBAR: Primary | ICD-10-CM

## 2022-12-27 DIAGNOSIS — M54.42 CHRONIC MIDLINE LOW BACK PAIN WITH LEFT-SIDED SCIATICA: ICD-10-CM

## 2022-12-27 DIAGNOSIS — M47.816 LUMBAR FACET ARTHROPATHY: ICD-10-CM

## 2022-12-27 PROBLEM — D64.9 ANEMIA: Status: ACTIVE | Noted: 2022-12-27

## 2022-12-27 PROBLEM — K57.90 DIVERTICULOSIS: Status: ACTIVE | Noted: 2022-12-27

## 2022-12-27 PROCEDURE — 99214 OFFICE O/P EST MOD 30 MIN: CPT | Performed by: PHYSICIAN ASSISTANT

## 2022-12-27 RX ORDER — CYCLOBENZAPRINE HCL 10 MG
10 TABLET ORAL 3 TIMES DAILY PRN
Qty: 90 TABLET | Refills: 2 | Status: SHIPPED | OUTPATIENT
Start: 2022-12-27

## 2022-12-27 RX ORDER — MELOXICAM 15 MG/1
15 TABLET ORAL DAILY
Qty: 30 TABLET | Refills: 2 | Status: SHIPPED | OUTPATIENT
Start: 2022-12-27

## 2022-12-27 NOTE — PROGRESS NOTES
"Chief Complaint  Back Pain, Leg Pain (Left to foot), Numbness (Left foot and hand ), and Tingling (Left foot and hand )    Subjective          April Lares who is a 62 y.o. year old female who presents to [facility] for Evaluation of the Spine.     The patient complains of pain located in the Lumbar Spine.  Patients states the pain has been present for \"a long long time\".  The pain came on gradually.  The pain scaled level is 9.  The pain does radiate. Dermatomes are located on left Lumbar at: a non-specific dermatome and to all the toes of the left foot.  The pain is constant and waxing/waning and described as \"sore\".  The pain is worse at no particular time of day. Patient states standing makes the pain better.  Patient states sitting for more than 5 minutes and sometimes laying down makes the pain worse.    Associated Symptoms Include: Numbness and Tingling in the left leg and foot, and the left hand in all the fingers. She does report subjective weakness in the left leg after getting up from sitting. She denies loss of bowel or bladder control.  Conservative Interventions Include: ER shots, muscles relaxers and pain medications, none of which were effective.    Was this the result of an injury or accident?: No    History of Previous Spinal Surgery?: No     reports that she has never smoked. She has never used smokeless tobacco.    Review of Systems   Musculoskeletal: Positive for back pain.        Objective   Vital Signs:   /62   Pulse 70   Ht 157.5 cm (62\")   Wt 64.4 kg (142 lb)   BMI 25.97 kg/m²       Physical Exam  Constitutional:       Appearance: Normal appearance.   Pulmonary:      Effort: Pulmonary effort is normal.   Musculoskeletal:         General: Tenderness (left lumbar spine and lumbar paraspinals) present.      Comments: SLR on the left causes pain in the lower back   Neurological:      General: No focal deficit present.      Mental Status: " She is alert and oriented to person, place, and time.      Sensory: No sensory deficit.      Motor: No weakness.      Deep Tendon Reflexes: Reflexes normal.   Psychiatric:         Mood and Affect: Mood normal.         Behavior: Behavior normal.        Neurologic Exam     Mental Status   Oriented to person, place, and time.        Result Review     I have personally reviewed the MRI of lumbar spine without contrast from 12/15/2022 which shows mild multilevel degenerative changes without high-grade central canal or foraminal stenosis.  Central canal narrowing appears worse at L4-L5 without convincing nerve root compression.     Assessment and Plan    Diagnoses and all orders for this visit:    1. DDD (degenerative disc disease), lumbar (Primary)  -     Ambulatory Referral to Pain Management    2. Lumbar facet arthropathy  -     Ambulatory Referral to Pain Management    3. Chronic midline low back pain with left-sided sciatica  -     Ambulatory Referral to Pain Management  -     cyclobenzaprine (FLEXERIL) 10 MG tablet; Take 1 tablet by mouth 3 (Three) Times a Day As Needed for Muscle Spasms.  Dispense: 90 tablet; Refill: 2  -     meloxicam (MOBIC) 15 MG tablet; Take 1 tablet by mouth Daily.  Dispense: 30 tablet; Refill: 2    Her pain is in the lower back and down the left leg to all the toes.    She does not have any high grade stenosis in the lumbar spine that helps explain her leg pain. I do not expect a surgical approach is likely to benefit her.    We discussed that she may consider LESB vs MBBs/RFA in the lumbar spine. I will refer her to [facility] in [place] to discuss treatment options.    She was referred for PT recently and is consulting with them in January. I would have her continue with that.    The patient was counseled on basic recommendations for the reduction and prevention of back, neck, or spine pain in association with spinal disorders, including: cessation/avoidance of nicotine use, maintenance of a  healthy BMI and weight, focusing on building/maintaining core strength through core exercise, and avoidance of activities which worsen the pain. The patient will monitor for changes in symptoms and notify our clinic of these changes as needed.    She will follow-up here PRN.  Follow Up   Return if symptoms worsen or fail to improve.  Patient was given instructions and counseling regarding her condition or for health maintenance advice. Please see specific information pulled into the AVS if appropriate.

## 2023-01-23 ENCOUNTER — HOSPITAL ENCOUNTER (OUTPATIENT)
Dept: MAMMOGRAPHY | Facility: HOSPITAL | Age: 63
Discharge: HOME | End: 2023-01-23
Admitting: NURSE PRACTITIONER
Payer: COMMERCIAL

## 2023-01-23 DIAGNOSIS — Z12.31 BREAST CANCER SCREENING BY MAMMOGRAM: ICD-10-CM

## 2023-01-23 PROCEDURE — 77067 SCR MAMMO BI INCL CAD: CPT

## 2023-01-23 PROCEDURE — 77063 BREAST TOMOSYNTHESIS BI: CPT

## 2023-12-27 ENCOUNTER — TRANSCRIBE ORDERS (OUTPATIENT)
Dept: ADMINISTRATIVE | Facility: HOSPITAL | Age: 63
End: 2023-12-27
Payer: COMMERCIAL

## 2023-12-27 DIAGNOSIS — Z12.31 BREAST CANCER SCREENING BY MAMMOGRAM: Primary | ICD-10-CM

## 2024-03-15 ENCOUNTER — HOSPITAL ENCOUNTER (OUTPATIENT)
Dept: MAMMOGRAPHY | Facility: HOSPITAL | Age: 64
Discharge: HOME | End: 2024-03-15
Admitting: NURSE PRACTITIONER
Payer: COMMERCIAL

## 2024-03-15 DIAGNOSIS — Z12.31 BREAST CANCER SCREENING BY MAMMOGRAM: ICD-10-CM

## 2024-03-15 PROCEDURE — 77067 SCR MAMMO BI INCL CAD: CPT

## 2024-03-15 PROCEDURE — 77063 BREAST TOMOSYNTHESIS BI: CPT

## 2024-05-24 ENCOUNTER — TELEPHONE (OUTPATIENT)
Dept: INTERNAL MEDICINE | Facility: CLINIC | Age: 64
End: 2024-05-24
Payer: COMMERCIAL

## 2024-05-24 NOTE — TELEPHONE ENCOUNTER
Caller: April Lares    Relationship to patient: Self    Best call back number: [phone]     Chief complaint: HEMORRHOIDS    Type of visit: OFFICE    Requested date: ASAP      Additional notes: PLEASE CALL AND SCHEDULE.

## 2025-01-17 ENCOUNTER — TRANSCRIBE ORDERS (OUTPATIENT)
Dept: ADMINISTRATIVE | Facility: HOSPITAL | Age: 65
End: 2025-01-17
Payer: COMMERCIAL

## 2025-01-17 DIAGNOSIS — Z12.31 VISIT FOR SCREENING MAMMOGRAM: Primary | ICD-10-CM

## 2025-03-18 ENCOUNTER — HOSPITAL ENCOUNTER (OUTPATIENT)
Dept: MAMMOGRAPHY | Facility: HOSPITAL | Age: 65
Discharge: HOME | End: 2025-03-18
Admitting: NURSE PRACTITIONER
Payer: COMMERCIAL

## 2025-03-18 DIAGNOSIS — Z12.31 VISIT FOR SCREENING MAMMOGRAM: ICD-10-CM

## 2025-03-18 PROCEDURE — 77067 SCR MAMMO BI INCL CAD: CPT

## 2025-03-18 PROCEDURE — 77063 BREAST TOMOSYNTHESIS BI: CPT
